# Patient Record
Sex: MALE | Race: WHITE | NOT HISPANIC OR LATINO | Employment: OTHER | URBAN - METROPOLITAN AREA
[De-identification: names, ages, dates, MRNs, and addresses within clinical notes are randomized per-mention and may not be internally consistent; named-entity substitution may affect disease eponyms.]

---

## 2018-07-26 ENCOUNTER — MEDICAL CORRESPONDENCE (OUTPATIENT)
Dept: HEALTH INFORMATION MANAGEMENT | Facility: CLINIC | Age: 83
End: 2018-07-26

## 2018-10-26 ENCOUNTER — TRANSFERRED RECORDS (OUTPATIENT)
Dept: HEALTH INFORMATION MANAGEMENT | Facility: CLINIC | Age: 83
End: 2018-10-26

## 2019-10-04 ENCOUNTER — TRANSFERRED RECORDS (OUTPATIENT)
Dept: HEALTH INFORMATION MANAGEMENT | Facility: CLINIC | Age: 84
End: 2019-10-04

## 2019-11-12 DIAGNOSIS — H53.10 SUBJECTIVE VISUAL DISTURBANCE: ICD-10-CM

## 2019-11-12 DIAGNOSIS — H53.60 NIGHT BLINDNESS: Primary | ICD-10-CM

## 2019-11-19 ENCOUNTER — MEDICAL CORRESPONDENCE (OUTPATIENT)
Dept: HEALTH INFORMATION MANAGEMENT | Facility: CLINIC | Age: 84
End: 2019-11-19

## 2019-12-06 DIAGNOSIS — H35.50 RETINAL DYSTROPHY: Primary | ICD-10-CM

## 2020-01-22 ENCOUNTER — ALLIED HEALTH/NURSE VISIT (OUTPATIENT)
Dept: OPHTHALMOLOGY | Facility: CLINIC | Age: 85
End: 2020-01-22
Attending: OPHTHALMOLOGY
Payer: COMMERCIAL

## 2020-01-22 DIAGNOSIS — H53.10 SUBJECTIVE VISUAL DISTURBANCE: ICD-10-CM

## 2020-01-22 DIAGNOSIS — H35.50 RETINAL DYSTROPHY: ICD-10-CM

## 2020-01-22 PROCEDURE — 40000269 ZZH STATISTIC NO CHARGE FACILITY FEE: Mod: ZF

## 2020-01-22 PROCEDURE — 92273 FULL FIELD ERG W/I&R: CPT | Mod: ZF

## 2020-01-22 ASSESSMENT — VISUAL ACUITY
OS_CC: 20/70
METHOD: SNELLEN - LINEAR
OD_CC+: -
OD_CC: 20/60
OS_CC+: -
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
## 2020-01-23 SDOH — HEALTH STABILITY: MENTAL HEALTH: HOW OFTEN DO YOU HAVE A DRINK CONTAINING ALCOHOL?: NEVER

## 2020-01-23 ASSESSMENT — REFRACTION_WEARINGRX
OS_CYLINDER: +1.25
SPECS_TYPE: BIFOCAL
OD_AXIS: 173
OD_ADD: +3.50
OS_AXIS: 022
OS_SPHERE: +0.25
OD_SPHERE: -2.00
OD_CYLINDER: +1.50
OS_ADD: +3.50

## 2020-02-03 RX ORDER — DORZOLAMIDE HYDROCHLORIDE AND TIMOLOL MALEATE 20; 5 MG/ML; MG/ML
1 SOLUTION/ DROPS OPHTHALMIC
COMMUNITY

## 2020-02-03 RX ORDER — LOSARTAN POTASSIUM 100 MG/1
0.5 TABLET ORAL DAILY
COMMUNITY
End: 2020-08-27

## 2020-02-03 RX ORDER — FINASTERIDE 5 MG/1
1 TABLET, FILM COATED ORAL DAILY
COMMUNITY

## 2020-02-03 RX ORDER — POLYVINYL ALCOHOL 14 MG/ML
1 SOLUTION/ DROPS OPHTHALMIC 3 TIMES DAILY
COMMUNITY

## 2020-02-03 RX ORDER — HYDROCHLOROTHIAZIDE 25 MG/1
1 TABLET ORAL DAILY
COMMUNITY

## 2020-02-03 RX ORDER — POTASSIUM CHLORIDE 1.5 G/1.58G
2 POWDER, FOR SOLUTION ORAL DAILY
COMMUNITY

## 2020-02-05 ENCOUNTER — ALLIED HEALTH/NURSE VISIT (OUTPATIENT)
Dept: OPHTHALMOLOGY | Facility: CLINIC | Age: 85
End: 2020-02-05
Attending: OPHTHALMOLOGY

## 2020-02-05 ENCOUNTER — OFFICE VISIT (OUTPATIENT)
Dept: OPHTHALMOLOGY | Facility: CLINIC | Age: 85
End: 2020-02-05
Attending: OPHTHALMOLOGY
Payer: COMMERCIAL

## 2020-02-05 DIAGNOSIS — H35.50 RETINAL DYSTROPHY: Primary | ICD-10-CM

## 2020-02-05 DIAGNOSIS — H53.10 SUBJECTIVE VISUAL DISTURBANCE: ICD-10-CM

## 2020-02-05 PROCEDURE — 92083 EXTENDED VISUAL FIELD XM: CPT | Mod: ZF | Performed by: OPHTHALMOLOGY

## 2020-02-05 PROCEDURE — 92134 CPTRZ OPH DX IMG PST SGM RTA: CPT | Mod: ZF | Performed by: OPHTHALMOLOGY

## 2020-02-05 PROCEDURE — 92250 FUNDUS PHOTOGRAPHY W/I&R: CPT | Mod: ZF | Performed by: OPHTHALMOLOGY

## 2020-02-05 PROCEDURE — G0463 HOSPITAL OUTPT CLINIC VISIT: HCPCS | Mod: ZF

## 2020-02-05 ASSESSMENT — REFRACTION_WEARINGRX
OS_CYLINDER: +1.25
OD_CYLINDER: +1.50
OS_SPHERE: +0.25
OD_AXIS: 173
SPECS_TYPE: BIFOCAL
OD_SPHERE: -2.00
OS_ADD: +3.50
OS_AXIS: 022
OD_ADD: +3.50

## 2020-02-05 ASSESSMENT — SLIT LAMP EXAM - LIDS
COMMENTS: NORMAL
COMMENTS: NORMAL

## 2020-02-05 ASSESSMENT — VISUAL ACUITY
OD_CC: 20/60
OD_CC+: -1
OS_CC+: -1
CORRECTION_TYPE: GLASSES
OS_CC: 20/50
METHOD: SNELLEN - LINEAR

## 2020-02-05 ASSESSMENT — CONF VISUAL FIELD
OD_NORMAL: 1
OS_NORMAL: 1
METHOD: COUNTING FINGERS

## 2020-02-05 ASSESSMENT — TONOMETRY
OD_IOP_MMHG: 21
IOP_METHOD: TONOPEN
OS_IOP_MMHG: 14

## 2020-02-05 ASSESSMENT — EXTERNAL EXAM - LEFT EYE: OS_EXAM: NORMAL

## 2020-02-05 ASSESSMENT — EXTERNAL EXAM - RIGHT EYE: OD_EXAM: NORMAL

## 2020-02-05 NOTE — NURSING NOTE
Chief Complaints and History of Present Illnesses   Patient presents with     Retinal Evaluation     Chief Complaint(s) and History of Present Illness(es)     Retinal Evaluation               Comments     Pt states vision is the same as a few weeks ago. No eye pain today. No flashes or floaters.  No redness. Dryness in both eyes, relief with drops.    SARATH Hitchcock February 5, 2020 11:36 AM

## 2020-02-05 NOTE — LETTER
2/5/2020       RE: Joey Vasquez  [address]  [place] [address]     Dear Colleague,    Thank you for referring your patient, Joey Vasquez, to the Eye Clinic at [facility]. Please see a copy of my visit note below.       CC: Nyctalopia, new referral from Dr. Kraft    HPI: Joey Vasquez is a  84 year old year-old patient referred by the [facility] for evaluation and treat   For a possible cone toro dystrophy. Patient complains of nyctalopia that started in childhood, he does not feel that his night vision trouble has changed. Central vision has worsened in the last 2-3 years. No flashes or floaters. He has not had any genetic testing.   Complaining of chronic Photosensitivity and some Problems with color vision:   No FH of retina degeneration    Ocular history:  EBMD complicated by recurrent erosion right eye (was treated at [place] 2019 Dr. Maki)  ocular hypertension   Age related macular degeneration  Color blindness   CEIOL both eyes 30 years ago    PMH: HTN    Family History:  2 grandsons color blind    Ocular Meds:  Cosopt twice a day both eyes   Artificial Tears both eyes as needed     Retinal Imaging:  OCT 2-5-20  RE: Normal foveal contour, Retinal pigment epithelium irregularities, pseudo-drusen and outer retina atrophic changes , no intraretinal fluid/SRF  LE: Normal foveal contour, Retinal pigment epithelium irregularities, pseudo-drusen and outer retina atrophic changes , no intraretinal fluid/SRF    Autofluorescence 2-5-20  right eye Mottled Hypoautofluorescence and Hyperautofluorescence   left eye Mottled Hypoautofluorescence and Hyperautofluorescence      goldmann visual field (GVF) 2-5-20  right eye 117 degrees horizontal. central scotoma  left eye  120 degrees horizontal. central scotoma    Assessment & Plan:    1. Retina dystrophy both eyes   Cone Toro Dystrophy vs Stargardt's Disease?  Less likely Age related macular degeneration    -Longstanding nyctalopia  -Color blind   -FFERG 1/22/20 [facility]  -Patient tinted lens evaluation with Chantal. Had tried different tints in the past. Gray tint seems to work OK for him   -Return for Low Vision (Mindy Ballesteros) and Dr. Montalvo for low vision Rx   -plan for invitae genetic testing today  - will follow up results  -Weekly Amsler Grid use was discussed and training performed.  -A diet of leafy green vegetables was encouraged.  -Return precautions were given.  - No smoking  - recommend use of sunglasses or tinted prescription     Pseudophakia, both eyes   -Stable  -Observe     return to clinic 6 months repeat Optical Coherence Tomography Shahram Lewis MD  Ophthalmology Resident, PGY-3    ~~~~~~~~~~~~~~~~~~~~~~~~~~~~~~~~~~   Complete documentation of historical and exam elements from today's encounter can be found in the full encounter summary report (not reduplicated in this progress note).  I personally obtained the chief complaint(s) and history of present illness.  I confirmed and edited as necessary the review of systems, past medical/surgical history, family history, social history, and examination findings as documented by others; and I examined the patient myself.  I personally reviewed the relevant tests, images, and reports as documented above.  I personally reviewed the ophthalmic test(s) associated with this encounter, agree with the interpretation(s) as documented by the resident/fellow, and have edited the corresponding report(s) as necessary.   I formulated and edited as necessary the assessment and plan and discussed the findings and management plan with the patient and family- Silvana Noel MD          Again, thank you for allowing me to participate in the care of your patient.      Sincerely,    Silvana Noel M.D.   of Ophthalmology  Vitreoretinal Surgeon  [facility] Endowed Chair  Department of Ophthalmology & Visual  Neurosciences  [facility]  Phone:  [phone]   Fax:  [phone]

## 2020-02-05 NOTE — PROGRESS NOTES
CC: Nyctalopia, new referral from Dr. Kraft    HPI: Joey Vasquez is a  84 year old year-old patient referred by the [facility] for evaluation and treat   For a possible cone toro dystrophy. Patient complains of nyctalopia that started in childhood, he does not feel that his night vision trouble has changed. Central vision has worsened in the last 2-3 years. No flashes or floaters. He has not had any genetic testing.   Complaining of chronic Photosensitivity and some Problems with color vision:   No FH of retina degeneration    Ocular history:  EBMD complicated by recurrent erosion right eye (was treated at [place] [address] Dr. Maki)  ocular hypertension   Age related macular degeneration  Color blindness   CEIOL both eyes 30 years ago    PMH: HTN    Family History:  2 grandsons color blind    Ocular Meds:  Cosopt twice a day both eyes   Artificial Tears both eyes as needed     Retinal Imaging:  OCT 2-5-20  RE: Normal foveal contour, Retinal pigment epithelium irregularities, pseudo-drusen and outer retina atrophic changes , no intraretinal fluid/SRF  LE: Normal foveal contour, Retinal pigment epithelium irregularities, pseudo-drusen and outer retina atrophic changes , no intraretinal fluid/SRF    Autofluorescence 2-5-20  right eye Mottled Hypoautofluorescence and Hyperautofluorescence   left eye Mottled Hypoautofluorescence and Hyperautofluorescence      goldmann visual field (GVF) 2-5-20  right eye 117 degrees horizontal. central scotoma  left eye  120 degrees horizontal. central scotoma    Assessment & Plan:    1. Retina dystrophy both eyes   Cone Toro Dystrophy vs Stargardt's Disease?  Less likely Age related macular degeneration   -Longstanding nyctalopia  -Color blind   -FFERG 1/22/20 [facility]  -Patient tinted lens evaluation with Chantal. Had tried different tints in the past. Gray tint seems to work OK for him   -Return for Low Vision (Mindy Ballesteros) and Dr. Montalvo for low vision Rx   -plan  for invitae genetic testing today  - will follow up results  -Weekly Amsler Grid use was discussed and training performed.  -A diet of leafy green vegetables was encouraged.  -Return precautions were given.  - No smoking  - recommend use of sunglasses or tinted prescription     Pseudophakia, both eyes   -Stable  -Observe     return to clinic 6 months repeat Optical Coherence Tomography Shahram Lewis MD  Ophthalmology Resident, PGY-3    ~~~~~~~~~~~~~~~~~~~~~~~~~~~~~~~~~~   Complete documentation of historical and exam elements from today's encounter can be found in the full encounter summary report (not reduplicated in this progress note).  I personally obtained the chief complaint(s) and history of present illness.  I confirmed and edited as necessary the review of systems, past medical/surgical history, family history, social history, and examination findings as documented by others; and I examined the patient myself.  I personally reviewed the relevant tests, images, and reports as documented above.  I personally reviewed the ophthalmic test(s) associated with this encounter, agree with the interpretation(s) as documented by the resident/fellow, and have edited the corresponding report(s) as necessary.   I formulated and edited as necessary the assessment and plan and discussed the findings and management plan with the patient and family    Silvana Noel MD   of Ophthalmology.  Retina Service   Department of Ophthalmology and Visual Neurosciences   [facility]  Phone: [phone]   Fax: [phone]

## 2020-02-13 ENCOUNTER — HOSPITAL ENCOUNTER (OUTPATIENT)
Dept: OCCUPATIONAL THERAPY | Facility: CLINIC | Age: 85
Discharge: HOME | End: 2020-02-13
Attending: OPHTHALMOLOGY | Admitting: OPHTHALMOLOGY
Payer: COMMERCIAL

## 2020-02-13 DIAGNOSIS — H53.10 SUBJECTIVE VISUAL DISTURBANCE: ICD-10-CM

## 2020-02-13 PROCEDURE — 97535 SELF CARE MNGMENT TRAINING: CPT | Mod: GO | Performed by: OCCUPATIONAL THERAPIST

## 2020-02-13 PROCEDURE — 97165 OT EVAL LOW COMPLEX 30 MIN: CPT | Mod: GO | Performed by: OCCUPATIONAL THERAPIST

## 2020-02-13 ASSESSMENT — VISUAL ACUITY
OS: 20/50
OD: 20/60

## 2020-02-13 ASSESSMENT — ACTIVITIES OF DAILY LIVING (ADL): PRIOR_ADL/IADL_STATUS: INDEPENDENT PRIOR TO ONSET

## 2020-02-13 NOTE — DISCHARGE INSTRUCTIONS
Clinical Findings:  1. Visual Acuity:  measured 20/60 in the right eye, and 20/50 in the left eye when you saw Dr. Noel.  2. Read Acuity: You read at 150 words per minute when the print was VERY large.  You slowed to 85 words per minute at 28 point font.   3. Visual Field:  demonstrated central gray spots in your central vision in both eyes.   4. Attention to visual field:  we measured this using the light board.  You scored 39.  A  safe driving  score is 52 or more.   5. Contrast Sensitivity: your ability to see things as they become more faint - you scored 4/25. This was severely reduced.  This makes it hard to see newsprint which is not black and white, to see when it is overcast or in dim light etc.   Recommendations:  1. We tried tints for use on cloudy, dim days.  You preferred NOIR frame 53 in light plum, tint 88.  2. We trialed an ipad for a larger screen for reading.  This would be easier than your current iphone with a small screen.  Plan:  [facility].     Mindy Ballesteros, SHAMARR/L  [facility]  [phone]
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
## 2020-02-20 ENCOUNTER — OFFICE VISIT (OUTPATIENT)
Dept: OPTOMETRY | Facility: CLINIC | Age: 85
End: 2020-02-20
Payer: COMMERCIAL

## 2020-02-20 DIAGNOSIS — H52.4 PRESBYOPIA: ICD-10-CM

## 2020-02-20 DIAGNOSIS — Z96.1 PSEUDOPHAKIA OF BOTH EYES: ICD-10-CM

## 2020-02-20 DIAGNOSIS — H35.50 RETINAL DYSTROPHY: Primary | ICD-10-CM

## 2020-02-20 ASSESSMENT — VISUAL ACUITY
CORRECTION_TYPE: GLASSES
OS_CC: 20/60
METHOD: SNELLEN - LINEAR
OD_CC: 20/70

## 2020-02-20 ASSESSMENT — EXTERNAL EXAM - RIGHT EYE: OD_EXAM: NORMAL

## 2020-02-20 ASSESSMENT — REFRACTION_MANIFEST
OD_AXIS: 165
OS_CYLINDER: +1.75
OS_ADD: +3.00
OS_AXIS: 015
OD_SPHERE: -2.00
OD_CYLINDER: +1.50
OS_SPHERE: +0.25
OD_ADD: +3.00

## 2020-02-20 ASSESSMENT — CONF VISUAL FIELD
OS_NORMAL: 1
OD_NORMAL: 1

## 2020-02-20 ASSESSMENT — SLIT LAMP EXAM - LIDS
COMMENTS: NORMAL
COMMENTS: NORMAL

## 2020-02-20 ASSESSMENT — EXTERNAL EXAM - LEFT EYE: OS_EXAM: NORMAL

## 2020-02-20 NOTE — PROGRESS NOTES
Assessment/Plan  (H35.50) Retinal dystrophy  (primary encounter diagnosis)  Comment: Genetic testing ordered at last visit- patient may have received results in the mail.   Plan: Discussed conditions in question with patient (AMD/Stargardts/cone-deann dystrophy). While no cures currently exist, patient would like to understand results of his genetic screening to see if his children and grandchildren should be tested. Advised patient that this provider could potentially help him make sense of the paperwork he received in the mail if he brings it in. Advised patient that while minimal improvements are possible with spectacles alone, having glasses that are tuned for his focal distance will likely be helpful. RTC with prolonged adaptation difficulties.     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     (H52.4) Presbyopia  Plan: REFRACTION [01507]        See above. SRx updated and released. Slight tint should help with indoor glare. Prior bifocals +3.50 add was likely too close for his working distance.       Complete documentation of historical and exam elements from today's encounter can  be found in the full encounter summary report (not reduplicated in this progress  note). I personally obtained the chief complaint(s) and history of present illness. I  confirmed and edited as necessary the review of systems, past medical/surgical  history, family history, social history, and examination findings as documented by  others; and I examined the patient myself. I personally reviewed the relevant tests,  images, and reports as documented above. I formulated and edited as necessary the  assessment and plan and discussed the findings and management plan with the  patient and family.    Chandler Montalvo OD

## 2020-04-15 ENCOUNTER — TELEPHONE (OUTPATIENT)
Dept: OPHTHALMOLOGY | Facility: CLINIC | Age: 85
End: 2020-04-15

## 2020-04-15 NOTE — TELEPHONE ENCOUNTER
Gene testing thru [facility] in February    Message received on triage line this afternoon.    Madalyn with [facility] calling stating sample insuffient for full testing and only able to provide partial results    If would like full report would need full sample    May call Madalyn at [phone] or use portal on website to contact    Note to Dr. Noel/facilitator     Valentino Kaur RN 5:03 PM 04/15/20    New tests were sent

## 2020-05-18 ENCOUNTER — TELEPHONE (OUTPATIENT)
Dept: OPHTHALMOLOGY | Facility: CLINIC | Age: 85
End: 2020-05-18

## 2020-05-18 NOTE — TELEPHONE ENCOUNTER
[facility] Call Center    Phone Message    May a detailed message be left on voicemail: yes     Reason for Call: Other: Margo from Genetic testing wanted a call back to discuss Pt ,[phone]  Thank you,    Action Taken: Message routed to:  [facility] eye    Travel Screening: Not Applicable     Unable to do 2nd sample of saliva, [facility] willing to  Do blood and even has mobile service if pt would like. Will discuss at next appt.   VEENA [place] COT 11:07 AM May 19, 2020

## 2020-08-26 ENCOUNTER — TELEPHONE (OUTPATIENT)
Dept: OPHTHALMOLOGY | Facility: CLINIC | Age: 85
End: 2020-08-26

## 2020-08-26 NOTE — TELEPHONE ENCOUNTER
Pt confirmed appt tomorrow with Dr. Noel    Pt will bring new oral medication and eye drop with to appt    Note to financial counselor to secure [facility] authorization-- [facility]     Pt also has [facility] authorization and will bring with to appt    Valentino Kaur RN 2:27 PM 08/26/20    ------    Spoke to pt at 1400    Pt states past 6 months been on eye drops and pills for glaucoma in right eye and possibly need laser procedure for hole in iris to relieve eye pressure    No previous notation per my review of narrow angle or narrow angle glaucoma    Pt prefers Thursday's for evaluations-- can arrange transportation     Reviewed Dr. Noel able to evaluate this Thursday or next    Pt will check on transportation for tomorrow and will confirm scheduling this afternoon    Valentino Kaur RN 2:09 PM 08/26/20        [facility] Call Center    Phone Message    May a detailed message be left on voicemail: yes     Reason for Call: Other: Pt's calling to schedule surgery, said he needs surgery for his Glaucoma and has a [facility] # , I see he has seen Dr Noel and Dr Montalvo along with Mindy Ballesteros, Please call Pt to discuss  Thank you,    Action Taken: Message routed to:  [facility]    Travel Screening: Not Applicable

## 2020-08-27 ENCOUNTER — TRANSFERRED RECORDS (OUTPATIENT)
Dept: HEALTH INFORMATION MANAGEMENT | Facility: CLINIC | Age: 85
End: 2020-08-27

## 2020-08-27 ENCOUNTER — OFFICE VISIT (OUTPATIENT)
Dept: OPHTHALMOLOGY | Facility: CLINIC | Age: 85
End: 2020-08-27
Attending: OPHTHALMOLOGY
Payer: COMMERCIAL

## 2020-08-27 DIAGNOSIS — H40.1110 PRIMARY OPEN ANGLE GLAUCOMA OF RIGHT EYE, UNSPECIFIED GLAUCOMA STAGE: Primary | ICD-10-CM

## 2020-08-27 PROCEDURE — G0463 HOSPITAL OUTPT CLINIC VISIT: HCPCS | Mod: ZF

## 2020-08-27 RX ORDER — LATANOPROST 50 UG/ML
1 SOLUTION/ DROPS OPHTHALMIC AT BEDTIME
Qty: 1 BOTTLE | Refills: 11 | Status: SHIPPED | OUTPATIENT
Start: 2020-08-27

## 2020-08-27 RX ORDER — SILICONE ADHESIVE 1.5" X 3"
SHEET (EA) TOPICAL DAILY
COMMUNITY

## 2020-08-27 RX ORDER — DORZOLAMIDE HYDROCHLORIDE AND TIMOLOL MALEATE 20; 5 MG/ML; MG/ML
1 SOLUTION/ DROPS OPHTHALMIC 2 TIMES DAILY
Qty: 1 BOTTLE | Refills: 11 | Status: SHIPPED | OUTPATIENT
Start: 2020-08-27

## 2020-08-27 ASSESSMENT — SLIT LAMP EXAM - LIDS
COMMENTS: NORMAL
COMMENTS: NORMAL

## 2020-08-27 ASSESSMENT — REFRACTION_WEARINGRX
OS_SPHERE: +0.25
SPECS_TYPE: BIFOCAL
OD_CYLINDER: +1.50
OS_CYLINDER: +1.25
OD_ADD: +3.50
OD_SPHERE: -2.00
OS_AXIS: 022
OS_ADD: +3.50
OD_AXIS: 173

## 2020-08-27 ASSESSMENT — CONF VISUAL FIELD
METHOD: COUNTING FINGERS
OS_NORMAL: 1
OD_NORMAL: 1

## 2020-08-27 ASSESSMENT — VISUAL ACUITY
OS_CC+: -2
METHOD: SNELLEN - LINEAR
OD_CC: 20/80
OD_CC+: +1
CORRECTION_TYPE: GLASSES

## 2020-08-27 ASSESSMENT — GONIOSCOPY
OS_SUPERIOR: SS
OD_SUPERIOR: CB
OS_TEMPORAL: CB
OD_INFERIOR: CB
OS_NASAL: SS
OD_NASAL: CB
OS_INFERIOR: SS
OD_TEMPORAL: CB

## 2020-08-27 ASSESSMENT — EXTERNAL EXAM - RIGHT EYE: OD_EXAM: NORMAL

## 2020-08-27 ASSESSMENT — TONOMETRY
IOP_METHOD: TONOPEN
OS_IOP_MMHG: 16
OD_IOP_MMHG: 24

## 2020-08-27 ASSESSMENT — EXTERNAL EXAM - LEFT EYE: OS_EXAM: NORMAL
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
## 2022-10-14 ENCOUNTER — HOSPITAL ENCOUNTER (OUTPATIENT)
Dept: NUCLEAR MEDICINE | Facility: CLINIC | Age: 87
Setting detail: NUCLEAR MEDICINE
Discharge: HOME | End: 2022-10-14
Attending: INTERNAL MEDICINE | Admitting: INTERNAL MEDICINE
Payer: COMMERCIAL

## 2022-10-14 ENCOUNTER — HOSPITAL ENCOUNTER (OUTPATIENT)
Dept: NUCLEAR MEDICINE | Facility: CLINIC | Age: 87
Setting detail: NUCLEAR MEDICINE
Discharge: HOME | End: 2022-10-14
Attending: INTERNAL MEDICINE
Payer: MEDICARE

## 2022-10-14 DIAGNOSIS — I50.32 CHRONIC DIASTOLIC (CONGESTIVE) HEART FAILURE (H): ICD-10-CM

## 2022-10-14 PROCEDURE — 78830 RP LOCLZJ TUM SPECT W/CT 1: CPT

## 2022-10-14 PROCEDURE — 343N000001 HC RX 343

## 2022-10-14 PROCEDURE — A9538 TC99M PYROPHOSPHATE: HCPCS

## 2022-10-14 PROCEDURE — 78830 RP LOCLZJ TUM SPECT W/CT 1: CPT | Mod: 26

## 2022-10-14 RX ORDER — TECHNETIUM TC99M PYROPHOSPHATE 12 MG/10ML
12-18 INJECTION INTRAVENOUS ONCE
Status: COMPLETED | OUTPATIENT
Start: 2022-10-14 | End: 2022-10-14

## 2022-10-14 RX ADMIN — TECHNETIUM TC99M PYROPHOSPHATE 15.6 MILLICURIE: 12 INJECTION INTRAVENOUS at 08:03

## 2024-07-08 NOTE — PROGRESS NOTES
"   02/13/20 1300   Signing Clinician's Name / Credentials   Signing clinician's name / credentials Mindy Ballesteros OTR/JOE   Session Number   Session Number 1   Author[facility] status authorized per [facility]   GOALS   Goals Near Vision;Environmental Modification   Goals Addressed this Session All goals addressed   Goal 1 - Near Vision   Goal Description: Near Vision   (pt will ID optimal electronic settings/device for reading)   Near Vision Goal Comment goal met with ID of ipad as optimal device for reading, with benefits of larger screen (now reads newspaper on small cell phone), and accessibility settings including inverted contrast in setting of severely limited contrast sensitivity   Target Date 02/13/20   Date Met 02/13/20   Goal 3 - Environmental modification   Goal Description: Environment modification   (Pt will ID optimal tint for glare management - low light)   Environmental Modification Goal Comment goal met with ID of light plum tint (NOIR 88) in frame 53 fitover sunglasses as optimal for comfort and clarity in cloudy/filtered light conditions   Target Date 02/13/20   Date Met 02/13/20       Present No   Therapy Diagnosis   Therapy  Diagnosis: Impaired ADL/IADL with deficits in Reading based ADL  (glare management - cloudy conditions for safety navigation)   Subjective Report   Subjective Report \"these are all too dark for cloudy days\" indicating current clip on and perscription sunglasses   Visual Re[facility] Treatment [place]   Daily Treatment [place] Self Care/Home Management   Self Care/Home Management   Self-Care/Home Mgmt/ADL, Compensatory, Meal Prep Minutes (15267) 40 Minutes   Skilled Intervention Training in use of electronic aids for reading;Trial of tinted eye glasses to manage symptoms of glare  (Pt Ed: eye disease, role of glasses vs. other interventions)   Patient Response verbalized and demonstrated understanding   Treatment Detail Pt with fair understanding of eye disease and available " Gastrointestinal Colonoscopy/Flexible Sigmoidoscopy - Lower Exam Discharge Instructions  Call Dr. Garay at [phone] for any problems or questions.  Contact the doctor’s office for follow up appointment as directed  Medication may cause drowsiness for several hours, therefore:  Do not drive or operate machinery for reminder of the day.  No alcohol today.  Do not make any important or legal decisions for 24 hours.  Do not sign any legal documents for 24 hours.  Ordinarily, you may resume regular diet and activity after exam unless otherwise specified by your physician.  Because of air put into your colon during exam, you may experience some abdominal distension, relieved by the passage of gas, for several hours.  Contact your physician if you have any of the following:  Excessive amount of bleeding - large amount when having a bowel movement.  Occasional specks of blood with bowel movement would not be unusual.  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 Trialed tint for glare managment - low light. Pt identified optimal comfort and clarity with plum (88) in fitover frames (NOIR 53).  Reading: pt with severely impaired contrast sensitivity requires inverted contrast for optimal reading. Pt reports he reads the newspaper on his small screen iphone 6 device. Trialed ipad to demosntrated benefits of increased display size. Pt identified benefits of minimizing scrolling for fluency and effecieincy. Pt verbalized plan to \"ask for one for my birthday.\"  Safety in navigation: identified hazards of navigating (walking, driving) with poor contrast sensitivity and central scotomas. Recommended pt limit navigation in hazardous conditions: i.e. low light, unfamiliar territory.    Therapeutic Activity: Dynavision   Dynavision Cascade Dynavision Mode A (single stimulus attention, 1 minute   Dynavision Mode A (single stimulus attention, 1 minute)   Patient Score (Mode A, 1 min) 39   MN Read   Smallest print size read 1.0M   Critical print size 8.0M   Words per minute at critical print size 150   Functional Reading Screen   Reading screen comments pt reports independent in homemaking, self care.  SRAFVP not indicated   Equipment/Resources   Written resources provided   (issued summary of findings and recommendations)   Education   Learner Patient   Readiness Eager;Acceptance   Method Booklet/handout;Explanation;Demonstration   Response Verbalizes understanding;Demonstrates understanding;Needs reinforcement   Communication with other professionals   Communication with other professionals per EHR   Plan   Plan for next session [facility]: [facility]   Total Session Time   Timed Code Treatment Minutes 40   Total Treatment Time (sum of timed and untimed services) 85     "